# Patient Record
Sex: FEMALE | Race: WHITE
[De-identification: names, ages, dates, MRNs, and addresses within clinical notes are randomized per-mention and may not be internally consistent; named-entity substitution may affect disease eponyms.]

---

## 2018-03-02 ENCOUNTER — HOSPITAL ENCOUNTER (EMERGENCY)
Dept: HOSPITAL 88 - ER | Age: 48
Discharge: LEFT BEFORE DISCHARGE | End: 2018-03-02
Payer: COMMERCIAL

## 2018-03-02 DIAGNOSIS — N20.0: Primary | ICD-10-CM

## 2018-03-06 ENCOUNTER — HOSPITAL ENCOUNTER (OUTPATIENT)
Dept: HOSPITAL 88 - RAD | Age: 48
End: 2018-03-06
Attending: UROLOGY
Payer: COMMERCIAL

## 2018-03-06 DIAGNOSIS — N20.0: Primary | ICD-10-CM

## 2018-03-06 PROCEDURE — 74018 RADEX ABDOMEN 1 VIEW: CPT

## 2018-03-06 NOTE — DIAGNOSTIC IMAGING REPORT
PROCEDURE:X-RAY ABDOMEN - KUB

 

COMPARISON:[facility], CT, CT ABDOMEN/PELVIS WO, 

2/21/2018, 14:13.

 

INDICATIONS:CALCULUS OF KIDNEY

 

FINDINGS:

No obstructive bowel gas pattern.

1.5 cm radiopaque density projecting in the left upper quadrant, 

corresponding to the previously visualized calculus in the left renal 

pelvis.

No radiopaque densities project over the right renal shadow or expected 

course of the ureters.

Stable pelvic phleboliths.

T-shaped IUD projected in the mid pelvis.

No acute bony abnormalities.

 

CONCLUSION:

Stable 1.5 cm calculus in the left renal pelvis. 

 

Mikey Anna M.D.  

Dictated by:  Mikey Anna M.D. on 3/06/2018 at 15:18     

Electronically approved by:  Mikey Anna M.D. on 3/06/2018 at 

15:18

## 2018-03-27 ENCOUNTER — HOSPITAL ENCOUNTER (OUTPATIENT)
Dept: HOSPITAL 88 - RAD | Age: 48
End: 2018-03-27
Attending: UROLOGY
Payer: COMMERCIAL

## 2018-03-27 DIAGNOSIS — N20.0: Primary | ICD-10-CM

## 2018-03-27 PROCEDURE — 74018 RADEX ABDOMEN 1 VIEW: CPT

## 2018-03-27 NOTE — DIAGNOSTIC IMAGING REPORT
PROCEDURE:X-RAY ABDOMEN - KUB

 

COMPARISON:[facility], CT, CT ABDOMEN/PELVIS WO, 

2/21/2018, 14:13.

 

INDICATIONS:HEMATURIA

 

FINDINGS:

Nonobstructive bowel gas pattern.

At least 3 radiopaque densities measuring 2 mm project over the left 

renal shadow.

2 mm radiopaque density projecting in the lower aspect of the right 

renal shadow.

No radiopaque shadows project over the expected course of the ureters 

or bladder.

No acute bony abnormalities.

T-shaped metallic IUD projects in the mid pelvis.

Pelvic phleboliths.

 

CONCLUSION:

Bilateral radiopaque densities measuring approximately 2 mm projecting 

on the renal shadows, which may represent nonobstructing calculi. The 

previously visualized 1.5 cm calculus in the left renal pelvis on CT 

dated 2/21/2018 is not seen on the current exam.  

Mikey Anna M.D.  

Dictated by:  Mikey Anna M.D. on 3/27/2018 at 17:52     

Electronically approved by:  Mikey Anna M.D. on 3/27/2018 at 

17:52